# Patient Record
Sex: MALE | Race: AMERICAN INDIAN OR ALASKA NATIVE | URBAN - METROPOLITAN AREA
[De-identification: names, ages, dates, MRNs, and addresses within clinical notes are randomized per-mention and may not be internally consistent; named-entity substitution may affect disease eponyms.]

---

## 2023-12-01 ENCOUNTER — DOCUMENTATION ONLY (OUTPATIENT)
Dept: INTERNAL MEDICINE | Facility: CLINIC | Age: 48
End: 2023-12-01
Payer: COMMERCIAL

## 2023-12-01 DIAGNOSIS — I10 PRIMARY HYPERTENSION: ICD-10-CM

## 2023-12-01 DIAGNOSIS — K08.9 POOR DENTITION: Primary | ICD-10-CM

## 2023-12-01 DIAGNOSIS — I07.1 SEVERE TRICUSPID VALVE REGURGITATION: ICD-10-CM

## 2023-12-01 DIAGNOSIS — F41.9 ANXIETY: ICD-10-CM

## 2023-12-01 DIAGNOSIS — R56.9 SEIZURE (H): ICD-10-CM

## 2023-12-01 DIAGNOSIS — I48.20 CHRONIC ATRIAL FIBRILLATION (H): ICD-10-CM

## 2023-12-01 RX ORDER — GABAPENTIN 400 MG/1
1200 CAPSULE ORAL 3 TIMES DAILY
COMMUNITY
Start: 2023-12-01 | End: 2024-01-04

## 2023-12-01 RX ORDER — AMOXICILLIN 500 MG/1
CAPSULE ORAL
COMMUNITY
Start: 2023-12-01

## 2023-12-01 RX ORDER — AMOXICILLIN 500 MG/1
2000 CAPSULE ORAL PRN
COMMUNITY
Start: 2023-12-01

## 2023-12-01 RX ORDER — LORAZEPAM 0.5 MG/1
0.5 TABLET ORAL 2 TIMES DAILY PRN
Qty: 30 TABLET | Refills: 0 | Status: SHIPPED | OUTPATIENT
Start: 2023-12-01 | End: 2023-12-11

## 2023-12-01 RX ORDER — METOPROLOL SUCCINATE 50 MG/1
50 TABLET, EXTENDED RELEASE ORAL DAILY
COMMUNITY
Start: 2023-12-01 | End: 2024-08-01

## 2023-12-01 NOTE — PROGRESS NOTES
Patient seen at [facility] ([facility]), wanting to change clinics due to clinic barriers around medications. Patient with Afib with RVR, severe TR with life vest. Has poor dentition and needs teeth removed before considering valve surgery. Will place order for refill of lorazapam for anxiety as needed. Place order for dental referral order also. Delayed trying to go to dental at [facility]. Scheduled an appointment for 12/11/23.      Kvng Padron MD  Internal Medicine  Primary Care Clinic, [place], [place]

## 2023-12-11 ENCOUNTER — LAB (OUTPATIENT)
Dept: LAB | Facility: CLINIC | Age: 48
End: 2023-12-11
Payer: COMMERCIAL

## 2023-12-11 ENCOUNTER — OFFICE VISIT (OUTPATIENT)
Dept: INTERNAL MEDICINE | Facility: CLINIC | Age: 48
End: 2023-12-11
Payer: COMMERCIAL

## 2023-12-11 VITALS
DIASTOLIC BLOOD PRESSURE: 84 MMHG | HEART RATE: 86 BPM | WEIGHT: 264.7 LBS | OXYGEN SATURATION: 96 % | HEIGHT: 73 IN | BODY MASS INDEX: 35.08 KG/M2 | SYSTOLIC BLOOD PRESSURE: 113 MMHG

## 2023-12-11 DIAGNOSIS — F43.10 PTSD (POST-TRAUMATIC STRESS DISORDER): ICD-10-CM

## 2023-12-11 DIAGNOSIS — Z13.6 CARDIOVASCULAR SCREENING; LDL GOAL LESS THAN 130: ICD-10-CM

## 2023-12-11 DIAGNOSIS — Z11.59 NEED FOR HEPATITIS B SCREENING TEST: ICD-10-CM

## 2023-12-11 DIAGNOSIS — E11.9 TYPE 2 DIABETES, HBA1C GOAL < 7% (H): ICD-10-CM

## 2023-12-11 DIAGNOSIS — I07.1 SEVERE TRICUSPID VALVE INSUFFICIENCY: ICD-10-CM

## 2023-12-11 DIAGNOSIS — Z11.59 NEED FOR HEPATITIS C SCREENING TEST: ICD-10-CM

## 2023-12-11 DIAGNOSIS — F41.9 ANXIETY: ICD-10-CM

## 2023-12-11 DIAGNOSIS — E11.9 TYPE 2 DIABETES, HBA1C GOAL < 7% (H): Primary | ICD-10-CM

## 2023-12-11 DIAGNOSIS — K08.9 POOR DENTITION: ICD-10-CM

## 2023-12-11 DIAGNOSIS — Z13.6 CARDIOVASCULAR SCREENING; LDL GOAL LESS THAN 100: ICD-10-CM

## 2023-12-11 DIAGNOSIS — E66.01 CLASS 2 SEVERE OBESITY DUE TO EXCESS CALORIES WITH SERIOUS COMORBIDITY AND BODY MASS INDEX (BMI) OF 35.0 TO 35.9 IN ADULT (H): ICD-10-CM

## 2023-12-11 DIAGNOSIS — Z11.4 SCREENING FOR HIV (HUMAN IMMUNODEFICIENCY VIRUS): ICD-10-CM

## 2023-12-11 DIAGNOSIS — E66.812 CLASS 2 SEVERE OBESITY DUE TO EXCESS CALORIES WITH SERIOUS COMORBIDITY AND BODY MASS INDEX (BMI) OF 35.0 TO 35.9 IN ADULT (H): ICD-10-CM

## 2023-12-11 DIAGNOSIS — I10 HYPERTENSION GOAL BP (BLOOD PRESSURE) < 140/90: ICD-10-CM

## 2023-12-11 DIAGNOSIS — I48.20 CHRONIC ATRIAL FIBRILLATION (H): ICD-10-CM

## 2023-12-11 LAB
ALBUMIN SERPL BCG-MCNC: 4.4 G/DL (ref 3.5–5.2)
ALP SERPL-CCNC: 128 U/L (ref 40–150)
ALT SERPL W P-5'-P-CCNC: 28 U/L (ref 0–70)
ANION GAP SERPL CALCULATED.3IONS-SCNC: 8 MMOL/L (ref 7–15)
AST SERPL W P-5'-P-CCNC: 45 U/L (ref 0–45)
BILIRUB SERPL-MCNC: 0.6 MG/DL
BUN SERPL-MCNC: 13.2 MG/DL (ref 6–20)
CALCIUM SERPL-MCNC: 9.6 MG/DL (ref 8.6–10)
CHLORIDE SERPL-SCNC: 105 MMOL/L (ref 98–107)
CHOLEST SERPL-MCNC: 150 MG/DL
CREAT SERPL-MCNC: 0.98 MG/DL (ref 0.67–1.17)
DEPRECATED HCO3 PLAS-SCNC: 30 MMOL/L (ref 22–29)
EGFRCR SERPLBLD CKD-EPI 2021: >90 ML/MIN/1.73M2
FASTING STATUS PATIENT QL REPORTED: NO
GLUCOSE SERPL-MCNC: 86 MG/DL (ref 70–99)
HBA1C MFR BLD: 5.7 %
HBV CORE AB SERPL QL IA: NONREACTIVE
HBV SURFACE AB SERPL IA-ACNC: 0 M[IU]/ML
HBV SURFACE AB SERPL IA-ACNC: NONREACTIVE M[IU]/ML
HBV SURFACE AG SERPL QL IA: NONREACTIVE
HDLC SERPL-MCNC: 44 MG/DL
HIV 1+2 AB+HIV1 P24 AG SERPL QL IA: NONREACTIVE
LDLC SERPL CALC-MCNC: 78 MG/DL
NONHDLC SERPL-MCNC: 106 MG/DL
POTASSIUM SERPL-SCNC: 4.7 MMOL/L (ref 3.4–5.3)
PROT SERPL-MCNC: 8.6 G/DL (ref 6.4–8.3)
SODIUM SERPL-SCNC: 143 MMOL/L (ref 135–145)
TRIGL SERPL-MCNC: 139 MG/DL

## 2023-12-11 PROCEDURE — 99000 SPECIMEN HANDLING OFFICE-LAB: CPT | Performed by: PATHOLOGY

## 2023-12-11 PROCEDURE — 36415 COLL VENOUS BLD VENIPUNCTURE: CPT | Performed by: PATHOLOGY

## 2023-12-11 PROCEDURE — 86706 HEP B SURFACE ANTIBODY: CPT | Performed by: HOSPITALIST

## 2023-12-11 PROCEDURE — 80061 LIPID PANEL: CPT | Performed by: PATHOLOGY

## 2023-12-11 PROCEDURE — 99204 OFFICE O/P NEW MOD 45 MIN: CPT | Performed by: HOSPITALIST

## 2023-12-11 PROCEDURE — 86704 HEP B CORE ANTIBODY TOTAL: CPT | Performed by: HOSPITALIST

## 2023-12-11 PROCEDURE — 86803 HEPATITIS C AB TEST: CPT | Performed by: HOSPITALIST

## 2023-12-11 PROCEDURE — 83036 HEMOGLOBIN GLYCOSYLATED A1C: CPT | Performed by: HOSPITALIST

## 2023-12-11 PROCEDURE — 80053 COMPREHEN METABOLIC PANEL: CPT | Performed by: PATHOLOGY

## 2023-12-11 PROCEDURE — 87389 HIV-1 AG W/HIV-1&-2 AB AG IA: CPT | Performed by: HOSPITALIST

## 2023-12-11 PROCEDURE — 87522 HEPATITIS C REVRS TRNSCRPJ: CPT | Performed by: HOSPITALIST

## 2023-12-11 PROCEDURE — 87340 HEPATITIS B SURFACE AG IA: CPT | Performed by: HOSPITALIST

## 2023-12-11 RX ORDER — LORAZEPAM 0.5 MG/1
0.5 TABLET ORAL DAILY PRN
Qty: 30 TABLET | Refills: 0 | Status: SHIPPED | OUTPATIENT
Start: 2023-12-11 | End: 2024-01-17

## 2023-12-11 RX ORDER — PRAZOSIN HYDROCHLORIDE 1 MG/1
2 CAPSULE ORAL AT BEDTIME
Qty: 60 CAPSULE | Refills: 3 | Status: SHIPPED | OUTPATIENT
Start: 2023-12-11 | End: 2024-04-17

## 2023-12-11 ASSESSMENT — ENCOUNTER SYMPTOMS
DIARRHEA: 0
NERVOUS/ANXIOUS: 1
SHORTNESS OF BREATH: 0
CHILLS: 0
FEVER: 0
ABDOMINAL PAIN: 0
BACK PAIN: 0
CONSTIPATION: 0

## 2023-12-11 NOTE — ASSESSMENT & PLAN NOTE
- Patient previously referred to dental for teeth removal.   - Patient to take Amoxicillin 2gm before dental procedure, continue on Amoxicillin 500mg twice a day for 5 days after.   - Patient to hold Eliquis 5 days before procedure. May restart 1 day after procedure if not bleeding.

## 2023-12-11 NOTE — ASSESSMENT & PLAN NOTE
- Continued on metoprolol and eliquis.  - Patient to hold Eliquis 5 days before dental procedure. May restart 1 day after procedure if not bleeding.

## 2023-12-11 NOTE — ASSESSMENT & PLAN NOTE
- Once seen by dental with dental removal, will refer back to [facility] for possible valve surgery.

## 2023-12-11 NOTE — ASSESSMENT & PLAN NOTE
- Will continue on gabapentin 1200mg TID for now.   - Lorazepam 0.5mg daily as needed for panic attacks.
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
## 2023-12-11 NOTE — PROGRESS NOTES
Ezio is a 48 year old that presents in clinic today for the following:     Chief Complaint   Patient presents with    Establish Care           12/11/2023     9:36 AM   Additional Questions   Roomed by YW   Accompanied by Partner, Orion       Screenings from encounters over the past 10 days    No data recorded       MARYLOU Khanna at 9:37 AM on 12/11/2023
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
## 2023-12-11 NOTE — PATIENT INSTRUCTIONS
- Keep appointment with dental. Follow prior protocol of holding eliquis and amoxicillin for dental procedures.   - Start on prazosin 1mg at bedtime for 1 week. If no dizziness, increase to 2mg at night and continue.   - Will continue on lorazepam 0.5mg daily as needed. If you don't need to use it, don't take.   - Will continue on gabapentin for now.   - Labs today.     - Consider obtaining Influenza and new booster for COVID19 vaccine at the pharmacy.    Follow up again in 3 months or as needed.

## 2023-12-12 LAB — HCV AB SERPL QL IA: REACTIVE

## 2023-12-14 LAB — HCV RNA SERPL NAA+PROBE-ACNC: NOT DETECTED IU/ML

## 2023-12-19 ENCOUNTER — TELEPHONE (OUTPATIENT)
Dept: INTERNAL MEDICINE | Facility: CLINIC | Age: 48
End: 2023-12-19
Payer: COMMERCIAL

## 2023-12-19 NOTE — TELEPHONE ENCOUNTER
[facility] Call Center    Phone Message    May a detailed message be left on voicemail: yes     Reason for Call: Was calling to get Plan of Care for the patient, has the patient ever been seen with the [employer] Life Vest on?  Also is the patient wearing the Life Vest to you r knowledge?  Please call.                   Action Taken: Message routed to:  [facility]    Travel Screening: Not Applicable

## 2023-12-20 NOTE — TELEPHONE ENCOUNTER
At a recent visit, I hadn't seen him wearing a life vest. However, I didn't have him take off his shirt. He was given a life vest after his hospitalization over the summer however. Can we call him directly to ask to give feedback, I'm guessing to [employer] Life Vest?    Kvng Padron MD  Internal Medicine  Primary Care Clinic, [place], [place]

## 2023-12-21 NOTE — TELEPHONE ENCOUNTER
There is only one pt's phone number listed on file and this phone number is not working. When I called x 2, no sound at all.    Left a message to Mary Hennessy that I could not release any information to her at this time.

## 2024-01-03 ENCOUNTER — TELEPHONE (OUTPATIENT)
Dept: INTERNAL MEDICINE | Facility: CLINIC | Age: 49
End: 2024-01-03
Payer: COMMERCIAL

## 2024-01-03 DIAGNOSIS — F41.9 ANXIETY: ICD-10-CM

## 2024-01-03 NOTE — TELEPHONE ENCOUNTER
Medication Question or Refill    Contacts         Type Contact Phone/Fax    01/03/2024 02:51 PM CST Phone (Incoming) Orion Garrido (Emergency Contact) [phone]     S.O calling on behalf of pt. Takes 400 mg, 3 tabs three times a day of gabapentin. Pt received 2 diff rx for [place] and is unable to fill the rx because the total qty does not match the dose / amount pt takes            What medication are you calling about (include dose and sig)?: gabapentin (NEURONTIN) 400 MG capsule     Preferred Pharmacy:     [id number] - [place] - [address]   [address] [place] [address]  Phone: [phone] Fax: [phone]      Controlled Substance Agreement on file:   CSA -- Patient Level:    CSA: None found at the patient level.       Who prescribed the medication?: Kvng Padron     Do you need a refill? Yes    When did you use the medication last? 01/03/2024    Patient offered an appointment? Yes: was seen 12/11/2023    Do you have any questions or concerns?  Yes, pt's refill was denied by pharmacy because the quantity did not match the amount pt takes. Please adjust accordingly and send over a new prescription so pt can refill their medication       Okay to leave a detailed message?: Yes at Home number on file [phone] (home)

## 2024-01-04 RX ORDER — GABAPENTIN 400 MG/1
1200 CAPSULE ORAL 3 TIMES DAILY
Qty: 270 CAPSULE | Refills: 5 | Status: SHIPPED | OUTPATIENT
Start: 2024-01-04 | End: 2024-06-20

## 2024-01-04 NOTE — TELEPHONE ENCOUNTER
Gabapentin : last filled on 12/5/23 with 30 day supply (270 tabs) per  data.  Per Dr. Padron, gabapentin 400 mg, 3 caps TID.

## 2024-01-16 DIAGNOSIS — F41.9 ANXIETY: ICD-10-CM

## 2024-01-16 DIAGNOSIS — F43.10 PTSD (POST-TRAUMATIC STRESS DISORDER): ICD-10-CM

## 2024-01-17 RX ORDER — LORAZEPAM 0.5 MG/1
0.5 TABLET ORAL DAILY PRN
Qty: 30 TABLET | Refills: 0 | Status: SHIPPED | OUTPATIENT
Start: 2024-01-17 | End: 2024-02-13

## 2024-01-17 NOTE — TELEPHONE ENCOUNTER
LORAZEPAM 0.5MG TABLETS   Last Written Prescription Date:  12/11/2023  Last Fill Quantity: 30,   # refills: 0  Last Office Visit : 12/11/2023  Future Office visit:  3/11/2024    Routing refill request to provider for review/approval because:  Drug not on the [facility], P or [facility] refill protocol or controlled substance    Oriana De La Cruz RN  Central Triage Red Flags/Med Refills

## 2024-02-13 DIAGNOSIS — F43.10 PTSD (POST-TRAUMATIC STRESS DISORDER): ICD-10-CM

## 2024-02-13 DIAGNOSIS — F41.9 ANXIETY: ICD-10-CM

## 2024-02-13 RX ORDER — LORAZEPAM 0.5 MG/1
0.5 TABLET ORAL DAILY PRN
Qty: 30 TABLET | Refills: 0 | Status: SHIPPED | OUTPATIENT
Start: 2024-02-17 | End: 2024-03-14

## 2024-02-13 NOTE — TELEPHONE ENCOUNTER
LORAZEPAM 0.5MG TABLETS   Last Written Prescription Date:  1/17/2024  Last Fill Quantity: 30,   # refills: 0  Last Office Visit : 12/11/2023  Future Office visit:  3/11/2024    Routing refill request to provider for review/approval because:  Drug not on the [facility], P or [facility] refill protocol or controlled substance    Oriana De La Cruz RN  Central Triage Red Flags/Med Refills

## 2024-03-13 DIAGNOSIS — F43.10 PTSD (POST-TRAUMATIC STRESS DISORDER): ICD-10-CM

## 2024-03-13 DIAGNOSIS — F41.9 ANXIETY: ICD-10-CM

## 2024-03-14 RX ORDER — LORAZEPAM 0.5 MG/1
0.5 TABLET ORAL DAILY PRN
Qty: 30 TABLET | Refills: 0 | Status: SHIPPED | OUTPATIENT
Start: 2024-03-15 | End: 2024-04-16

## 2024-03-14 NOTE — TELEPHONE ENCOUNTER
LORAZEPAM 0.5MG TABLETS        Last Written Prescription Date:  2/17/24  Last Fill Quantity: 30,   # refills: 0   Last Office Visit : 12/11/2023  Future Office visit:  None  Routing refill request to provider for review/approval because:  Controlled substance

## 2024-04-09 DIAGNOSIS — F43.10 PTSD (POST-TRAUMATIC STRESS DISORDER): ICD-10-CM

## 2024-04-15 DIAGNOSIS — F43.10 PTSD (POST-TRAUMATIC STRESS DISORDER): ICD-10-CM

## 2024-04-15 DIAGNOSIS — F41.9 ANXIETY: ICD-10-CM

## 2024-04-16 RX ORDER — LORAZEPAM 0.5 MG/1
0.5 TABLET ORAL DAILY PRN
Qty: 30 TABLET | Refills: 0 | Status: SHIPPED | OUTPATIENT
Start: 2024-04-16 | End: 2024-05-20

## 2024-04-16 NOTE — TELEPHONE ENCOUNTER
LORAZEPAM 0.5MG TABLETS   Last Written Prescription Date:  3/15/2024  Last Fill Quantity: 30,   # refills: 0  Last Office Visit : 12/11/2023  Future Office visit:  None    Routing refill request to provider for review/approval because:  Drug not on the G, P or [facility] refill protocol or controlled substance    Oriana De La Cruz RN  Central Triage Red Flags/Med Refills

## 2024-04-17 RX ORDER — PRAZOSIN HYDROCHLORIDE 1 MG/1
CAPSULE ORAL
Qty: 180 CAPSULE | Refills: 3 | Status: SHIPPED | OUTPATIENT
Start: 2024-04-17

## 2024-04-17 NOTE — TELEPHONE ENCOUNTER
prazosin (MINIPRESS) 1 MG capsule    Last Written Prescription Date:  12/11/23  Last Fill Quantity: 60,   # refills: 3   Last Office Visit : 12/11/2023  Future Office visit:  None    Routing refill request to provider for review/approval because:  Fails protocol as: Medication indicated for associated diagnosis  ALLOWED:   Medication is associated with one or more of the following diagnoses: Hypertension              Raynaud's Disease              Nightmares              Sleep Disturbance  CURRENT ASSOCIATED DIAGNOSIS IS PTSD

## 2024-05-17 DIAGNOSIS — F43.10 PTSD (POST-TRAUMATIC STRESS DISORDER): ICD-10-CM

## 2024-05-17 DIAGNOSIS — F41.9 ANXIETY: ICD-10-CM

## 2024-05-17 NOTE — TELEPHONE ENCOUNTER
LORazepam (ATIVAN) 0.5 MG tablet 30 tablet 0 4/16/2024       Last Office Visit: 12/11/23  Future Office visit:   none      Routing refill request to provider for review/approval because:  Controlled medication    Coco Roman RN  P Red Flag Triage/MRT

## 2024-05-20 RX ORDER — LORAZEPAM 0.5 MG/1
0.5 TABLET ORAL DAILY PRN
Qty: 30 TABLET | Refills: 0 | Status: SHIPPED | OUTPATIENT
Start: 2024-05-20 | End: 2024-06-20

## 2024-06-16 ENCOUNTER — HEALTH MAINTENANCE LETTER (OUTPATIENT)
Age: 49
End: 2024-06-16

## 2024-06-19 DIAGNOSIS — F41.9 ANXIETY: ICD-10-CM

## 2024-06-19 DIAGNOSIS — F43.10 PTSD (POST-TRAUMATIC STRESS DISORDER): ICD-10-CM

## 2024-06-19 NOTE — TELEPHONE ENCOUNTER
LORAZEPAM 0.5MG TABLETS       Last Written Prescription Date:  5-20-24  Last Fill Quantity: 30,   # refills: 0  Last Office Visit : 12-11-23  Future Office visit:  8-12-24    Routing refill request to provider for review/approval because:  Drug not on the [facility], [facility] or  Health refill protocol or controlled substance     GABAPENTIN 400MG CAPSULES       Last Written Prescription Date:  1-4-24  Last Fill Quantity: 270,   # refills: 5    Routing refill request to provider for review/approval because:  Drug not on the G, P or  Health refill protocol or controlled substance

## 2024-06-20 RX ORDER — LORAZEPAM 0.5 MG/1
0.5 TABLET ORAL DAILY PRN
Qty: 30 TABLET | Refills: 0 | Status: SHIPPED | OUTPATIENT
Start: 2024-06-20 | End: 2024-07-19

## 2024-06-20 RX ORDER — GABAPENTIN 400 MG/1
CAPSULE ORAL
Qty: 270 CAPSULE | Refills: 0 | Status: SHIPPED | OUTPATIENT
Start: 2024-06-20 | End: 2024-07-19

## 2024-07-19 DIAGNOSIS — F43.10 PTSD (POST-TRAUMATIC STRESS DISORDER): ICD-10-CM

## 2024-07-19 DIAGNOSIS — F41.9 ANXIETY: ICD-10-CM

## 2024-07-19 NOTE — TELEPHONE ENCOUNTER
LORazepam (ATIVAN) 0.5 MG tablet 30 tablet 0 6/20/2024     gabapentin (NEURONTIN) 400 MG capsule 270 capsule 0 6/20/2024     Last Office Visit : 12/11/23  Future Office visit:  8/12/24    Routing refill request to provider for review/approval because:  Drug not on the [facility], P or [facility] refill protocol or controlled substance

## 2024-07-19 NOTE — TELEPHONE ENCOUNTER
[facility] Call Center    Phone Message    May a detailed message be left on voicemail: yes     Reason for Call: Medication Refill Request    Has the patient contacted the pharmacy for the refill? Yes   Name of medication being requested:     LORazepam (ATIVAN)   gabapentin (NEURONTIN)    Provider who prescribed the medication:  Kvng Padron MD  Pharmacy:    [id number] - [place], [place] - [address] [place] [facility] [address]    Date medication is needed: 7/19/2024    Pt states he called 3 days ago for this refill request. Writer does not see any documentation of this. Please advise.    Action Taken: Other: PCC    Travel Screening: Not Applicable     Date of Service:

## 2024-07-20 RX ORDER — GABAPENTIN 400 MG/1
1200 CAPSULE ORAL 3 TIMES DAILY
Qty: 270 CAPSULE | Refills: 0 | Status: SHIPPED | OUTPATIENT
Start: 2024-07-20 | End: 2024-08-15

## 2024-07-20 RX ORDER — LORAZEPAM 0.5 MG/1
0.5 TABLET ORAL DAILY PRN
Qty: 30 TABLET | Refills: 0 | Status: SHIPPED | OUTPATIENT
Start: 2024-07-20 | End: 2024-08-15

## 2024-08-01 DIAGNOSIS — I10 PRIMARY HYPERTENSION: ICD-10-CM

## 2024-08-01 DIAGNOSIS — I48.20 CHRONIC ATRIAL FIBRILLATION (H): ICD-10-CM

## 2024-08-01 RX ORDER — METOPROLOL SUCCINATE 50 MG/1
50 TABLET, EXTENDED RELEASE ORAL DAILY
Qty: 90 TABLET | Refills: 1 | Status: SHIPPED | OUTPATIENT
Start: 2024-08-01 | End: 2024-08-15

## 2024-08-01 NOTE — TELEPHONE ENCOUNTER
metoprolol succinate ER (TOPROL XL) 50 MG 24 hr tablet   -- -- 12/1/2023 -- No    Sig - Route: Take 1 tablet (50 mg) by mouth daily - Oral   Class: Historical     Routing refill request to provider for review/approval because:  Medication is reported/historical        Last Office Visit : 12-  Future Office visit:  8-

## 2024-08-01 NOTE — TELEPHONE ENCOUNTER
[facility] Call Center    Phone Message    May a detailed message be left on voicemail: yes     Reason for Call: Medication Refill Request    Has the patient contacted the pharmacy for the refill? Yes   Name of medication being requested:   metoprolol succinate ER (TOPROL XL) 50 MG 24 hr tablet   Provider who prescribed the medication: Dr Padron  Pharmacy:   [facility] #[id number] - [place], [place] - [address]      Date medication is needed: ASAP  patient is out and needs it states that they called the pharmacy.      Action Taken: Message routed to:  [facility] P[facility]    Travel Screening: Not Applicable     Date of Service:

## 2024-08-15 ENCOUNTER — OFFICE VISIT (OUTPATIENT)
Dept: INTERNAL MEDICINE | Facility: CLINIC | Age: 49
End: 2024-08-15
Payer: COMMERCIAL

## 2024-08-15 ENCOUNTER — LAB (OUTPATIENT)
Dept: LAB | Facility: CLINIC | Age: 49
End: 2024-08-15
Payer: COMMERCIAL

## 2024-08-15 VITALS
WEIGHT: 261 LBS | BODY MASS INDEX: 34.59 KG/M2 | SYSTOLIC BLOOD PRESSURE: 124 MMHG | HEIGHT: 73 IN | DIASTOLIC BLOOD PRESSURE: 88 MMHG | HEART RATE: 88 BPM | OXYGEN SATURATION: 97 %

## 2024-08-15 DIAGNOSIS — I07.1 SEVERE TRICUSPID VALVE INSUFFICIENCY: ICD-10-CM

## 2024-08-15 DIAGNOSIS — K08.9 POOR DENTITION: ICD-10-CM

## 2024-08-15 DIAGNOSIS — I48.20 CHRONIC ATRIAL FIBRILLATION (H): ICD-10-CM

## 2024-08-15 DIAGNOSIS — I10 PRIMARY HYPERTENSION: ICD-10-CM

## 2024-08-15 DIAGNOSIS — E11.9 TYPE 2 DIABETES, HBA1C GOAL < 7% (H): ICD-10-CM

## 2024-08-15 DIAGNOSIS — F43.10 PTSD (POST-TRAUMATIC STRESS DISORDER): ICD-10-CM

## 2024-08-15 DIAGNOSIS — F41.9 ANXIETY: ICD-10-CM

## 2024-08-15 DIAGNOSIS — E11.9 TYPE 2 DIABETES, HBA1C GOAL < 7% (H): Primary | ICD-10-CM

## 2024-08-15 LAB
ALBUMIN SERPL BCG-MCNC: 4.1 G/DL (ref 3.5–5.2)
ALP SERPL-CCNC: 149 U/L (ref 40–150)
ALT SERPL W P-5'-P-CCNC: 24 U/L (ref 0–70)
ANION GAP SERPL CALCULATED.3IONS-SCNC: 10 MMOL/L (ref 7–15)
AST SERPL W P-5'-P-CCNC: 42 U/L (ref 0–45)
BILIRUB SERPL-MCNC: 0.5 MG/DL
BUN SERPL-MCNC: 11.9 MG/DL (ref 6–20)
CALCIUM SERPL-MCNC: 9.2 MG/DL (ref 8.8–10.4)
CHLORIDE SERPL-SCNC: 107 MMOL/L (ref 98–107)
CREAT SERPL-MCNC: 1.01 MG/DL (ref 0.67–1.17)
EGFRCR SERPLBLD CKD-EPI 2021: >90 ML/MIN/1.73M2
GLUCOSE SERPL-MCNC: 101 MG/DL (ref 70–99)
HBA1C MFR BLD: 5.7 %
HCO3 SERPL-SCNC: 25 MMOL/L (ref 22–29)
LDLC SERPL DIRECT ASSAY-MCNC: 79 MG/DL
POTASSIUM SERPL-SCNC: 4.3 MMOL/L (ref 3.4–5.3)
PROT SERPL-MCNC: 7.5 G/DL (ref 6.4–8.3)
SODIUM SERPL-SCNC: 142 MMOL/L (ref 135–145)

## 2024-08-15 PROCEDURE — 99000 SPECIMEN HANDLING OFFICE-LAB: CPT | Performed by: PATHOLOGY

## 2024-08-15 PROCEDURE — 99214 OFFICE O/P EST MOD 30 MIN: CPT | Performed by: HOSPITALIST

## 2024-08-15 PROCEDURE — 83721 ASSAY OF BLOOD LIPOPROTEIN: CPT | Performed by: HOSPITALIST

## 2024-08-15 PROCEDURE — 80053 COMPREHEN METABOLIC PANEL: CPT | Performed by: PATHOLOGY

## 2024-08-15 PROCEDURE — 36415 COLL VENOUS BLD VENIPUNCTURE: CPT | Performed by: PATHOLOGY

## 2024-08-15 PROCEDURE — 83036 HEMOGLOBIN GLYCOSYLATED A1C: CPT | Performed by: HOSPITALIST

## 2024-08-15 RX ORDER — AMLODIPINE BESYLATE 10 MG/1
10 TABLET ORAL DAILY
COMMUNITY
End: 2024-08-15

## 2024-08-15 RX ORDER — LORAZEPAM 0.5 MG/1
0.5 TABLET ORAL DAILY PRN
Qty: 30 TABLET | Refills: 0 | Status: SHIPPED | OUTPATIENT
Start: 2024-08-15 | End: 2024-09-12

## 2024-08-15 RX ORDER — GABAPENTIN 400 MG/1
1200 CAPSULE ORAL 3 TIMES DAILY
Qty: 270 CAPSULE | Refills: 0 | Status: SHIPPED | OUTPATIENT
Start: 2024-08-15 | End: 2024-09-12

## 2024-08-15 RX ORDER — METOPROLOL SUCCINATE 50 MG/1
50 TABLET, EXTENDED RELEASE ORAL DAILY
Qty: 90 TABLET | Refills: 3 | Status: SHIPPED | OUTPATIENT
Start: 2024-08-15

## 2024-08-15 RX ORDER — MIDAZOLAM 5 MG/.1ML
SPRAY NASAL
COMMUNITY
Start: 2023-07-19 | End: 2024-08-15

## 2024-08-15 RX ORDER — ALBUTEROL SULFATE 90 UG/1
AEROSOL, METERED RESPIRATORY (INHALATION)
COMMUNITY

## 2024-08-15 RX ORDER — AMLODIPINE BESYLATE 10 MG/1
10 TABLET ORAL DAILY
Qty: 90 TABLET | Refills: 3 | Status: SHIPPED | OUTPATIENT
Start: 2024-08-15
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
## 2024-08-15 NOTE — PATIENT INSTRUCTIONS
- Referral to mental health.   - Referral to dental again. Patient to take Amoxicillin 2 grams 1 hour before procedure and continue Amoxicillin 500mg twice a day for 5 days after. Hold eliquis 4 days before procedure, restart 1 day after procedure if not bleeding issues.   - Call Dr. Padron if you have pain to teeth or swelling of gums. Will  need to prescribe 10 days of amoxicillin if this happens.     - Check labs for LDL, A1c, CMP.   - Start back on prazosin 2mg at bedtime.   - Continue all other medications.     - May consider colonoscopy referral in the future.     Follow up again in 4 months

## 2024-08-16 NOTE — ASSESSMENT & PLAN NOTE
- Referral for dental placed again. Awaiting dental cares before considering tricuspid valve surgery at [facility].   - For dental procedures: Patient to take Amoxicillin 2 grams 1 hour before procedure and continue Amoxicillin 500mg twice a day for 5 days after. Hold eliquis 4 days before procedure, restart 1 day after procedure if not bleeding issues.   - Patient to call Dr. Padron if having pain to teeth or swelling of gums. Will  need to prescribe 10 days of amoxicillin if this happens.

## 2024-09-11 DIAGNOSIS — F43.10 PTSD (POST-TRAUMATIC STRESS DISORDER): ICD-10-CM

## 2024-09-11 DIAGNOSIS — F41.9 ANXIETY: ICD-10-CM

## 2024-09-12 RX ORDER — LORAZEPAM 0.5 MG/1
0.5 TABLET ORAL DAILY PRN
Qty: 30 TABLET | Refills: 0 | Status: SHIPPED | OUTPATIENT
Start: 2024-09-14

## 2024-09-12 RX ORDER — GABAPENTIN 400 MG/1
1200 CAPSULE ORAL 3 TIMES DAILY
Qty: 270 CAPSULE | Refills: 2 | Status: SHIPPED | OUTPATIENT
Start: 2024-09-14

## 2024-09-12 NOTE — TELEPHONE ENCOUNTER
GABAPENTIN 400MG CAPSULES       Last Written Prescription Date:  8-15-24  Last Fill Quantity: 270,   # refills: 3   LORAZEPAM 0.5MG TABLETS       Last Written Prescription Date:  8-15-24  Last Fill Quantity: 30,   # refills: 0  Last Office Visit : 8-15-24  Future Office visit:  12-19-24    Routing refill request to provider for review/approval because:  Drug not on the [facility] or [facility] refill protocol or controlled substance

## 2024-10-07 DIAGNOSIS — F43.10 PTSD (POST-TRAUMATIC STRESS DISORDER): ICD-10-CM

## 2024-10-07 DIAGNOSIS — F41.9 ANXIETY: ICD-10-CM

## 2024-10-08 NOTE — TELEPHONE ENCOUNTER
Medication Requested:  LORazepam (ATIVAN) 0.5 MG tablet 30 tablet 0 9/14/2024 -- No   Sig - Route: Take 1 tablet (0.5 mg) by mouth daily as needed for anxiety. - Oral     ----------------------  Last Office Visit : 8/15/2024  [facility] Internal Medicine [facility] Office visit:     12/19/2024 4:00 PM (30 min)  David   Arrive by:  3:45 PM   [facility] RETURN   [facility]   Kvng Padron MD     ----------------------        Refill decision: Refill pended and routed to the provider for review/determination due to the following criteria not met:       Controlled med request

## 2024-10-10 RX ORDER — LORAZEPAM 0.5 MG/1
0.5 TABLET ORAL DAILY PRN
Qty: 30 TABLET | Refills: 0 | Status: SHIPPED | OUTPATIENT
Start: 2024-10-12 | End: 2024-11-09

## 2024-10-10 NOTE — TELEPHONE ENCOUNTER
Covering for PCP/ordering provider (out of clinic today):  Notes and  reviewed, he is receiving this prescription regularly on a monthly basis.  One refill sent for 10/12, further refills per PCP.    Thanks,  Robert Bowden MD

## 2024-11-08 DIAGNOSIS — F43.10 PTSD (POST-TRAUMATIC STRESS DISORDER): ICD-10-CM

## 2024-11-08 DIAGNOSIS — F41.9 ANXIETY: ICD-10-CM

## 2024-11-09 RX ORDER — LORAZEPAM 0.5 MG/1
0.5 TABLET ORAL DAILY PRN
Qty: 30 TABLET | Refills: 0 | Status: SHIPPED | OUTPATIENT
Start: 2024-11-11

## 2024-11-09 NOTE — TELEPHONE ENCOUNTER
LORazepam (ATIVAN) 0.5 MG tablet    Disp 30 R 0  Start: 10/12/2024     8/15/2024  [facility] Internal Medicine Kvng Soto MD  Internal Medicine    Routed because:  controlled

## 2024-11-28 DIAGNOSIS — F41.9 ANXIETY: ICD-10-CM

## 2024-12-03 RX ORDER — GABAPENTIN 400 MG/1
CAPSULE ORAL
Qty: 270 CAPSULE | Refills: 0 | Status: SHIPPED | OUTPATIENT
Start: 2024-12-04

## 2024-12-03 NOTE — TELEPHONE ENCOUNTER
gabapentin (NEURONTIN) 400 MG capsule       Last Written Prescription Date:  9/14/24  Last Fill Quantity: 270,   # refills: 2  Last Office Visit : 8/15/24  Future Office visit:  12/19/24    Routing refill request to provider for review/approval because:  Gabapentin not on protocol

## 2024-12-03 NOTE — TELEPHONE ENCOUNTER
Controlled substance refill request notes - [place] reviewed 12/03/24    Refill request received for: Gabap[place] 400 Mg Capsule  [place]  Medication last refill: 270 tab, 30 day supply, filled/sold to patient on 11/04/2024  Pended order: Gabapentin 400 Mg Capsule, 270 tab, 30 day supply, fill on or after 12/04/2024     Primary care provider: Kvng Padron  Last office visit this department: 8/15/2024  Last virtual visit this department: Visit date not found  Next appointment with PCP: 12/19/2024     Refill request forwarded to provider for review.     Christy VARGAS LPN  [facility]

## 2024-12-09 DIAGNOSIS — F41.9 ANXIETY: ICD-10-CM

## 2024-12-09 DIAGNOSIS — F43.10 PTSD (POST-TRAUMATIC STRESS DISORDER): ICD-10-CM

## 2024-12-10 RX ORDER — LORAZEPAM 0.5 MG/1
0.5 TABLET ORAL DAILY PRN
Qty: 30 TABLET | Refills: 0 | Status: SHIPPED | OUTPATIENT
Start: 2024-12-10

## 2024-12-10 NOTE — TELEPHONE ENCOUNTER
Controlled substance refill request notes    Refill request received for: Lorazepam 0.5 Mg Tablet  [place]  data reviewed 12/10/24:  Medication last refill: 30 tab, 30 day supply, filled/sold to patient on 11/10/2024  Pended order: Lorazepam 0.5 Mg Tablet, 30 tab, 30 day supply, fill on or after 12/10/2024     Primary care provider: Kvng Padron  Last office visit this department: 8/15/2024  Last virtual visit this department: Visit date not found  Next appointment with PCP: 12/19/2024    Refill request forwarded to provider for review.     Christy VARGAS LPN  [facility]

## 2024-12-10 NOTE — TELEPHONE ENCOUNTER
LORAZEPAM 0.5MG TABLETS       Last Written Prescription Date:  11/11/24  Last Fill Quantity: 30,   # refills: 0   Last office visit : 8/15/2024   Next appointment with PCP: 12/19/2024     Routing refill request to provider for review/approval because:  Controlled med

## 2025-01-02 DIAGNOSIS — F41.9 ANXIETY: ICD-10-CM

## 2025-01-06 RX ORDER — GABAPENTIN 400 MG/1
CAPSULE ORAL
Qty: 270 CAPSULE | Refills: 0 | Status: SHIPPED | OUTPATIENT
Start: 2025-01-06

## 2025-01-06 NOTE — TELEPHONE ENCOUNTER
Patient called looking for status of Gabapentin, please refill or call patient with status.      [id number] - [place] - [address] AT [facility] [address]

## 2025-01-06 NOTE — TELEPHONE ENCOUNTER
Controlled substance refill request notes    Refill request received for: Gabapentin 400 Mg Capsule  [place]  data reviewed 01/06/25:  Medication last refill: 270 tab, 30 day supply, filled/sold to patient on 12/04/2024  Pended order: Gabapentin 400 Mg Capsule, 270 tab, 30 day supply, no delay in fill date     Primary care provider: Kvng Padron  Last office visit this department: 8/15/2024  Last virtual visit this department: Visit date not found  Next appointment with PCP: 01/14/2025    Refill request forwarded to provider for review.     Christy VARGAS LPN  [facility]

## 2025-01-14 DIAGNOSIS — F41.9 ANXIETY: ICD-10-CM

## 2025-01-14 DIAGNOSIS — F43.10 PTSD (POST-TRAUMATIC STRESS DISORDER): ICD-10-CM

## 2025-01-14 RX ORDER — LORAZEPAM 0.5 MG/1
0.5 TABLET ORAL DAILY PRN
Qty: 30 TABLET | Refills: 0 | Status: SHIPPED | OUTPATIENT
Start: 2025-01-14

## 2025-01-14 NOTE — TELEPHONE ENCOUNTER
Controlled substance refill request notes    Refill request received for: Lorazepam 0.5 Mg Tablet  [place]  data reviewed 01/14/25:  Medication last refill: 30 tab, 30 day supply, filled/sold to patient on 12/14/2024  Pended order: Lorazepam 0.5 Mg Tablet, 30 tab, 30 day supply, no delay in fill date     Primary care provider: Kvng Padron  Last office visit this department: 8/15/2024  Last virtual visit this department: Visit date not found  Next appointment with PCP:   Future Appointments 1/14/2025 - 7/13/2025        Date Visit Type Length Department Provider     1/14/2025  4:30 PM [facility] RETURN 30 min [facility] Kvng Padron MD    Location Instructions:     [facility] ([facility]) is in a dense urban area with multiple transportation and parking options. You may wish to review options for  service and self-parking in more detail on the [facility] s website at [email or web address].&nbsp;                      Refill request forwarded to provider for review.     Christy VARGAS LPN  [facility]

## 2025-01-14 NOTE — TELEPHONE ENCOUNTER
LORAZEPAM 0.5MG TABLETS      Last Written Prescription Date:  12/10/24  Last Fill Quantity: 30,   # refills: 0  Last Office Visit : 8/15/24  Future Office visit:  1/14/25    Routing refill request to provider for review/approval because:  Controlled  LORAZEPAM 0.5MG TABLETS

## 2025-02-02 DIAGNOSIS — F41.9 ANXIETY: ICD-10-CM

## 2025-02-05 DIAGNOSIS — F43.10 PTSD (POST-TRAUMATIC STRESS DISORDER): ICD-10-CM

## 2025-02-05 DIAGNOSIS — F41.9 ANXIETY: ICD-10-CM

## 2025-02-05 RX ORDER — GABAPENTIN 400 MG/1
CAPSULE ORAL
Qty: 270 CAPSULE | Refills: 0 | Status: SHIPPED | OUTPATIENT
Start: 2025-02-05

## 2025-02-05 RX ORDER — LORAZEPAM 0.5 MG/1
0.5 TABLET ORAL DAILY PRN
Qty: 30 TABLET | Refills: 0 | Status: SHIPPED | OUTPATIENT
Start: 2025-02-15

## 2025-02-05 NOTE — TELEPHONE ENCOUNTER
Controlled substance refill request notes    Refill request received for: Lorazepam 0.5 Mg Tablet  [place] reviewed 02/05/25:  Medication last refill: 30 tab, 30 day supply, filled/sold to patient on 01/16/2025  Pended order: Lorazepam 0.5 Mg Tablet, 30 tab, 30 day supply, fill on or after 02/15/2025     Primary care provider: Kvng Padron  Last office visit this department: 8/15/2024  Last virtual visit this department: Visit date not found  Next appointment with PCP:   Future Appointments 2/5/2025 - 8/4/2025        Date Visit Type Length Department Provider     2/20/2025  4:00 PM [facility] RETURN 30 min [facility] vKng Padron MD    Location Instructions:     [facility] ([facility]) is in a dense urban area with multiple transportation and parking options. You may wish to review options for  service and self-parking in more detail on the [facility] s website at [email or web address].&nbsp;                      Refill request forwarded to provider for review.     ROBBIE Mata [facility]

## 2025-02-05 NOTE — TELEPHONE ENCOUNTER
[facility] Call [facility]    Phone Message    May a detailed message be left on voicemail: yes     Reason for Call: Medication Refill Request    Has the patient contacted the pharmacy for the refill? Yes   Name of medication being requested: LORazepam (ATIVAN)   Provider who prescribed the medication: Kvng Padron MD  Pharmacy:    [id number] - [place] - [address] [facility] [address]    Date medication is needed: ASAP      Action Taken: Other: PCC    Travel Screening: Not Applicable     Date of Service:

## 2025-02-05 NOTE — TELEPHONE ENCOUNTER
Controlled substance refill request notes    Refill request received for: Gabapentin 400 Mg Capsule  [place]  data reviewed 02/05/25:  Medication last refill: 270 tab, 30 day supply, filled/sold to patient on 01/06/2025  Pended order: Gabapentin 400 Mg Capsule, 270 tab, 30 day supply, fill on or after 02/05/2025    Primary care provider: Kvng Padron  Last office visit this department: 8/15/2024  Last virtual visit this department: Visit date not found  Next appointment with PCP:   Future Appointments 2/5/2025 - 8/4/2025        Date Visit Type Length Department Provider     2/20/2025  4:00 PM [facility] RETURN 30 min [facility] Kvng Padron MD    Location Instructions:     [facility] ([facility]) is in a dense urban area with multiple transportation and parking options. You may wish to review options for  service and self-parking in more detail on the [facility] s website at [email or web address].&nbsp;                      Refill request forwarded to provider for review.     Christy VARGAS LPN  [facility]

## 2025-02-12 DIAGNOSIS — F43.10 PTSD (POST-TRAUMATIC STRESS DISORDER): ICD-10-CM

## 2025-02-12 DIAGNOSIS — F41.9 ANXIETY: ICD-10-CM

## 2025-02-12 RX ORDER — LORAZEPAM 0.5 MG/1
TABLET ORAL
Qty: 30 TABLET | OUTPATIENT
Start: 2025-02-12

## 2025-02-12 NOTE — TELEPHONE ENCOUNTER
LORazepam (ATIVAN) 0.5 MG /30 tab/0 refill has been sent on 2/15/2025 to [facility] #[id number] - [place], [place] - [address] AT [facility] [address]

## 2025-03-02 ENCOUNTER — HEALTH MAINTENANCE LETTER (OUTPATIENT)
Age: 50
End: 2025-03-02

## 2025-03-02 DIAGNOSIS — F41.9 ANXIETY: ICD-10-CM

## 2025-03-06 RX ORDER — GABAPENTIN 400 MG/1
CAPSULE ORAL
Qty: 270 CAPSULE | Refills: 1 | Status: SHIPPED | OUTPATIENT
Start: 2025-03-08

## 2025-03-06 NOTE — TELEPHONE ENCOUNTER
Last Written Prescription:  gabapentin (NEURONTIN) 400 MG capsule 270 capsule 0 2/5/2025     ----------------------  Last Visit Date: 8/15/24  Future Visit Date: 4/22/25  ----------------------        Refill decision: Medication unable to be refilled by RN due to: Medication not included in refill protocol policy         Request from pharmacy:  Requested Prescriptions   Pending Prescriptions Disp Refills    gabapentin (NEURONTIN) 400 MG capsule [Pharmacy Med Name: GABAPENTIN 400MG CAPSULES] 270 capsule 0     Sig: TAKE 3 CAPSULES(1200 MG) BY MOUTH THREE TIMES DAILY       There is no refill protocol information for this order

## 2025-03-06 NOTE — TELEPHONE ENCOUNTER
Covering for PCP/ordering prescriber (out of office):  Refills sent.   Follow-up as scheduled.     Thanks,  Robert Bowden MD

## 2025-03-06 NOTE — TELEPHONE ENCOUNTER
Controlled substance refill request notes    Refill request received for: Gabapentin 400 Mg Capsule  [place] reviewed 03/06/25:  Medication last refill: 270 tab, 30 day supply, filled/sold to patient on 02/06/2025  Pended order: Gabapentin 400 Mg Capsule, 270 tab, 30 day supply, fill on or after 03/08/2025     Primary care provider: Kvng Padron  Last office visit this department: 8/15/2024 - Dr. Padron  Next appointment with PCP:   Future Appointments 3/6/2025 - 9/2/2025        Date Visit Type Length Department Provider     4/22/2025  4:30 PM [facility] RETURN 30 min [facility] Kvng Padron MD    Location Instructions:     [facility] ([facility]) is in a dense urban area with multiple transportation and parking options. You may wish to review options for  service and self-parking in more detail on the [facility] s website at [email or web address].&nbsp;                      Refill request forwarded to provider for review.     Christy VARGAS LPN  [facility]

## 2025-03-24 DIAGNOSIS — F43.10 PTSD (POST-TRAUMATIC STRESS DISORDER): ICD-10-CM

## 2025-03-24 DIAGNOSIS — F41.9 ANXIETY: ICD-10-CM

## 2025-03-24 RX ORDER — LORAZEPAM 0.5 MG/1
0.5 TABLET ORAL DAILY PRN
Qty: 30 TABLET | Refills: 0 | Status: SHIPPED | OUTPATIENT
Start: 2025-03-24

## 2025-03-24 NOTE — TELEPHONE ENCOUNTER
Controlled substance refill request notes    Refill request received for: Lorazepam 0.5 Mg Tablet  [place]  data reviewed 03/24/25:  Medication last refill: 30 tab, 30 day supply, filled/sold to patient on 02/16/2025  Pended order: Lorazepam 0.5 Mg Tablet, 30 tab, 30 day supply, no delay in fill date     Primary care provider: Kvng Padron  Last office visit this department: 8/15/2024  Next appointment with PCP:   Future Appointments 3/24/2025 - 9/20/2025        Date Visit Type Length Department Provider     4/22/2025  4:30 PM [facility] RETURN 30 min [facility] Kvng Padron MD    Location Instructions:     [facility] ([facility]) is in a dense urban area with multiple transportation and parking options. You may wish to review options for  service and self-parking in more detail on the [facility] s website at [email or web address].&nbsp;                      Refill request forwarded to provider for review.     Christy VARGAS LPN  [facility]

## 2025-03-24 NOTE — TELEPHONE ENCOUNTER
[facility] Call Center    Phone Message    May a detailed message be left on voicemail: yes     Reason for Call: Medication Refill Request    Has the patient contacted the pharmacy for the refill? Yes     Name of medication being requested:   LORazepam (ATIVAN) 0.5 MG tablet     Provider who prescribed the medication: Vito    Pharmacy:   [facility] #[id number] - [place], [place] - [address] [place]        Date medication is needed: 3/24/2025       Action Taken: Message routed to:  [facility]    Travel Screening: Not Applicable     Date of Service:

## 2025-04-21 DIAGNOSIS — F41.9 ANXIETY: ICD-10-CM

## 2025-04-21 DIAGNOSIS — F43.10 PTSD (POST-TRAUMATIC STRESS DISORDER): ICD-10-CM

## 2025-04-22 ENCOUNTER — OFFICE VISIT (OUTPATIENT)
Dept: INTERNAL MEDICINE | Facility: CLINIC | Age: 50
End: 2025-04-22
Payer: COMMERCIAL

## 2025-04-22 VITALS
WEIGHT: 257.5 LBS | SYSTOLIC BLOOD PRESSURE: 119 MMHG | TEMPERATURE: 98.8 F | RESPIRATION RATE: 18 BRPM | BODY MASS INDEX: 34.13 KG/M2 | OXYGEN SATURATION: 97 % | DIASTOLIC BLOOD PRESSURE: 79 MMHG | HEART RATE: 89 BPM | HEIGHT: 73 IN

## 2025-04-22 DIAGNOSIS — J06.9 VIRAL UPPER RESPIRATORY TRACT INFECTION: ICD-10-CM

## 2025-04-22 DIAGNOSIS — F43.10 PTSD (POST-TRAUMATIC STRESS DISORDER): ICD-10-CM

## 2025-04-22 DIAGNOSIS — F41.9 ANXIETY: ICD-10-CM

## 2025-04-22 DIAGNOSIS — F41.1 GENERALIZED ANXIETY DISORDER: ICD-10-CM

## 2025-04-22 DIAGNOSIS — E11.9 TYPE 2 DIABETES, HBA1C GOAL < 7% (H): Primary | ICD-10-CM

## 2025-04-22 DIAGNOSIS — L84 CALLUS OF FOOT: ICD-10-CM

## 2025-04-22 DIAGNOSIS — K08.9 POOR DENTITION: ICD-10-CM

## 2025-04-22 DIAGNOSIS — Z23 NEED FOR PNEUMOCOCCAL VACCINE: ICD-10-CM

## 2025-04-22 PROCEDURE — 90471 IMMUNIZATION ADMIN: CPT | Performed by: HOSPITALIST

## 2025-04-22 PROCEDURE — 90677 PCV20 VACCINE IM: CPT | Performed by: HOSPITALIST

## 2025-04-22 PROCEDURE — 99214 OFFICE O/P EST MOD 30 MIN: CPT | Mod: 25 | Performed by: HOSPITALIST

## 2025-04-22 RX ORDER — LORAZEPAM 0.5 MG/1
0.5 TABLET ORAL 2 TIMES DAILY PRN
Qty: 60 TABLET | Refills: 0 | Status: SHIPPED | OUTPATIENT
Start: 2025-04-24

## 2025-04-22 RX ORDER — AMOXICILLIN 500 MG/1
CAPSULE ORAL
Qty: 10 CAPSULE | Refills: 2 | Status: SHIPPED | OUTPATIENT
Start: 2025-04-22

## 2025-04-22 RX ORDER — LORAZEPAM 0.5 MG/1
0.5 TABLET ORAL
Qty: 30 TABLET | Refills: 0 | Status: SHIPPED | OUTPATIENT
Start: 2025-04-24 | End: 2025-04-22

## 2025-04-22 RX ORDER — AMOXICILLIN 500 MG/1
2000 CAPSULE ORAL PRN
Qty: 4 CAPSULE | Refills: 2 | Status: SHIPPED | OUTPATIENT
Start: 2025-04-22

## 2025-04-22 NOTE — ASSESSMENT & PLAN NOTE
BP controlled, last A1c was 5.7 and LDL was 79 in August. Monofilament of feet with normal sensation.   - Recheck A1c and urine albumin.  - [facility] at [place] for last eye exam. Was told he had a normal diabetic eye exam recently.

## 2025-04-22 NOTE — PROGRESS NOTES
Assessment & Plan   Problem List Items Addressed This Visit       Viral upper respiratory tract infection     Appears to be recovering. Continue supportive cares.          Type 2 diabetes, HbA1c goal < 7% (H) - Primary     BP controlled, last A1c was 5.7 and LDL was 79 in August. Monofilament of feet with normal sensation.   - Recheck A1c and urine albumin.  - [facility] for [facility] at [place] for last eye exam. Was told he had a normal diabetic eye exam recently.             Relevant Orders    Albumin Random Urine Quantitative with Creat Ratio    HEMOGLOBIN A1C    Poor dentition     - Referral previously for dental. Awaiting dental cares before considering tricuspid valve surgery at [facility]. Will message clinic RN to follow up on referral.   - For dental procedures: Patient to take Amoxicillin 2 grams 1 hour before procedure and continue Amoxicillin 500mg twice a day for 5 days after. Hold eliquis 4 days before procedure, restart 1 day after procedure if not bleeding issues.   - Patient to call Dr. Padron if having pain to teeth or swelling of gums. Will  need to prescribe 10 days of amoxicillin if this happens.          Relevant Medications    amoxicillin (AMOXIL) 500 MG capsule    amoxicillin (AMOXIL) 500 MG capsule    Callus of foot     Large callus along left foot. Monofilament sensation normal in both feet today.   - Referral to podiatry.          Relevant Orders    Orthopedic  Referral    Anxiety disorder     - Consider in the future to start a medication daily (selective serotonin reuptake inhibitor category) for anxiety and work to decrease ativan use.   - Continued on ativan prn. Continued on gabapentin 1200mg TID.           Other Visit Diagnoses       Need for pneumococcal vaccine        Relevant Orders    Pneumococcal 20 Valent Conjugate (Prevnar 20) (Completed)                  BMI  Estimated body mass index is 34.39 kg/m  as calculated from the following:    Height as of this encounter:  "1.843 m (6' 0.56\").    Weight as of this encounter: 116.8 kg (257 lb 8 oz).       Follow-up  Return in about 4 months (around 8/22/2025).    Selin Holguin is a 49 year old, presenting for the following health issues:  Follow Up and Refill Request      4/22/2025     5:05 PM   Additional Questions   Roomed by Shanel LAY     History of Present Illness       Reason for visit:  Follow up       Mentions no calls form dental yet for his teeth. Hasn't had a follow up with cardiology again. Has some swelling in his legs. No shortness of breath. No shortness of breath when laying down flat.     Mentions having some sickness about 1 week ago and now improving. Had nasal drainage and sore throat. Had a cough and concerned about pneumonia as he's had this in the past.     Continues on ativan and prazosin at night. Using ativan up to twice a day for his anxiety. Discussed considering an additional medication for his anxiety due to risk of withdrawal with ativan use if not taking it. He did not like prior use of buspirone. Did not want to start any new medication at this time for anxiety. Significant other is present and mentions that the past year he has been doing more in public.       Review of Systems  Constitutional, neuro, ENT, endocrine, pulmonary, cardiac, gastrointestinal, genitourinary, musculoskeletal, integument and psychiatric systems are negative, except as otherwise noted.      Objective    /79 (BP Location: Right arm, Patient Position: Sitting, Cuff Size: Adult Large)   Pulse 89   Temp 98.8  F (37.1  C)   Resp 18   Ht 1.843 m (6' 0.56\")   Wt 116.8 kg (257 lb 8 oz)   SpO2 97%   BMI 34.39 kg/m    Body mass index is 34.39 kg/m .  Physical Exam   GENERAL: alert and no distress  EYES: Eyes grossly normal to inspection, and conjunctivae and sclerae normal  HENT: nose and mouth without ulcers or lesions. Difficult visualizing posterior pharynx. No tenderness over frontal or maxillary sinuses.   NECK: " no adenopathy, no asymmetry, masses, or scars  RESP: lungs clear to auscultation - no rales, rhonchi or wheezes  CV: regular rate and rhythm, normal S1 S2, no S3 or S4, no murmur, click or rub, no peripheral edema  MS: no gross musculoskeletal defects noted, no edema  SKIN: no  rashes. Left bottom of foot with about 1.5cm callus present. Very small callus along right bottom of foot.   NEURO: Normal strength and tone, mentation intact and speech normal  PSYCH: mentation appears normal, affect normal/bright            Signed Electronically by: Kvng Padron MD

## 2025-04-22 NOTE — ASSESSMENT & PLAN NOTE
Large callus along left foot. Monofilament sensation normal in both feet today.   - Referral to podiatry.

## 2025-04-22 NOTE — TELEPHONE ENCOUNTER
Controlled substance refill request notes    Refill request received for: Lorazepam 0.5 Mg Tablet  [place]  data reviewed 04/22/25:  Medication last refill: qty 30, 30 day supply, filled/sold to patient on 03/25/2025  Pended order: Lorazepam 0.5 Mg Tablet, qty 30, 30 day supply, fill on or after 04/24/2025     Primary care provider: Kvng Padron  Last office visit with this department: 8/15/2024  Next appointment with PCP:   Future Appointments 4/22/2025 - 10/19/2025        Date Visit Type Length Department Provider     4/22/2025  4:30 PM [facility] RETURN 30 min [facility] Kvng Padron MD    Location Instructions:     Due to [place] [address], travel times to this location may be longer than usual. Please plan for extra travel time and check the [place] Department of Transportation [address] [place] website for delay, closure, and detour information.  [facility] ([facility]) is in a dense urban area with multiple transportation and parking options. You may wish to review options for  service and self-parking in more detail on the [facility] s website at [email or web address].                     Refill request forwarded to provider for review.     Christy VARGAS LPN  [facility]

## 2025-04-22 NOTE — ASSESSMENT & PLAN NOTE
- Consider in the future to start a medication daily (selective serotonin reuptake inhibitor category) for anxiety and work to decrease ativan use.   - Continued on ativan prn. Continued on gabapentin 1200mg TID.

## 2025-04-22 NOTE — TELEPHONE ENCOUNTER
Last Written Prescription:     LORazepam (ATIVAN) 0.5 MG tablet 30 tablet 0 3/24/2025 -- No   Sig - Route: Take 1 tablet (0.5 mg) by mouth daily as needed for anxiety. MUST KEEP 4/22/2025 VISIT FOR REFILLS - Oral     ----------------------  Last Visit Date: 8/15/24  Future Visit Date: 4/22/25    Refill decision: Medication unable to be refilled by RN due to: Controlled medication LORazepam (ATIVAN) 0.5 MG tablet        Request from pharmacy:  Requested Prescriptions   Pending Prescriptions Disp Refills    LORazepam (ATIVAN) 0.5 MG tablet [Pharmacy Med Name: LORAZEPAM 0.5MG TABLETS] 30 tablet      Sig: TAKE 1 TABLET(0.5 MG) BY MOUTH DAILY AS NEEDED FOR ANXIETY       Rx Protocol Controlled Substance Failed - 4/22/2025  9:05 AM        Failed - Visit with relevant provider in past 3 months or upcoming 3 months        Failed - Medication is active on med list and the sig matches        Failed - Urine drug screeen results on file in past 12 months     [unfilled]           Failed - Controlled Substance Agreement on file in last 12 months     Please review last Controlled Substance Pain agreement document.   CSA -- Encounter Level:    CSA: None found at the encounter level.       CSA -- Patient Level:    CSA: None found at the patient level.               Failed - Auto Fail - Please forward to Provider        Passed - Medication not refilled in past 28 days     Invalid Medication Grouper          Passed - No Opioids on active med list

## 2025-04-22 NOTE — PATIENT INSTRUCTIONS
- [facility] for [facility] at [place] for last eye exam.   - Labs for A1c and urine albumin when able.   - [id number] vaccine.   - Will ask my nurse to call about dental referral.   - Referral to podiatry.   - Consider in the future to start a medication daily (selective serotonin reuptake inhibitor category) for anxiety and work to decrease ativan use.       Follow up again in 4 months.

## 2025-04-22 NOTE — ASSESSMENT & PLAN NOTE
- Referral previously for dental. Awaiting dental cares before considering tricuspid valve surgery at [facility]. Will message clinic RN to follow up on referral.   - For dental procedures: Patient to take Amoxicillin 2 grams 1 hour before procedure and continue Amoxicillin 500mg twice a day for 5 days after. Hold eliquis 4 days before procedure, restart 1 day after procedure if not bleeding issues.   - Patient to call Dr. Padron if having pain to teeth or swelling of gums. Will  need to prescribe 10 days of amoxicillin if this happens.

## 2025-04-23 ENCOUNTER — TELEPHONE (OUTPATIENT)
Dept: INTERNAL MEDICINE | Facility: CLINIC | Age: 50
End: 2025-04-23
Payer: COMMERCIAL

## 2025-04-23 ENCOUNTER — PATIENT OUTREACH (OUTPATIENT)
Dept: CARE COORDINATION | Facility: CLINIC | Age: 50
End: 2025-04-23
Payer: COMMERCIAL

## 2025-04-23 NOTE — TELEPHONE ENCOUNTER
[facility] dental clinic ( school of dentistry, dental clinic)  Appointments: [phone]    Spoke with pt and phone number given. If it does not work, he will contact us.    Soon-Mi  ----------------------------------------------------------------------------      ----- Message from Kvng Padron sent at 4/22/2025  5:21 PM CDT -----  Regarding: Dental referral?  Raj Ford,     I replaced a referral to [facility] in the past for dental. Unclear what the issue is with dental referral. Patient has been waiting for a few years now. Has a severe tricuspid valve and Afib history, hoping for dental with removal of a few poorly dentured teeth prior to valve surgery. Able to follow up about the dental referral?      Kvng Padron MD  Internal Medicine  Primary Care Clinic, [place], [place]

## 2025-04-30 DIAGNOSIS — F41.9 ANXIETY: ICD-10-CM

## 2025-05-01 RX ORDER — GABAPENTIN 400 MG/1
CAPSULE ORAL
Qty: 270 CAPSULE | Refills: 1 | Status: SHIPPED | OUTPATIENT
Start: 2025-05-03

## 2025-05-01 NOTE — TELEPHONE ENCOUNTER
Last Written Prescription:   Disp Refills Start End DEEDEE   gabapentin (NEURONTIN) 400 MG capsule 270 capsule 1 3/8/2025 -- No   Sig: TAKE 3 CAPSULES(1200 MG) BY MOUTH THREE TIMES DAILY     ----------------------  Last Visit Date:   4/22/2025  [facility] [place]      Future Visit Date: 8/26/2025  ----------------------      Refill decision: Medication unable to be refilled by RN due to: Medication not included in refill protocol policy         Request from pharmacy:  Requested Prescriptions   Pending Prescriptions Disp Refills    gabapentin (NEURONTIN) 400 MG capsule [Pharmacy Med Name: GABAPENTIN 400MG CAPSULES] 270 capsule 1     Sig: TAKE 3 CAPSULES(1200 MG) BY MOUTH THREE TIMES DAILY       There is no refill protocol information for this order

## 2025-05-25 DIAGNOSIS — F43.10 PTSD (POST-TRAUMATIC STRESS DISORDER): ICD-10-CM

## 2025-05-25 DIAGNOSIS — F41.9 ANXIETY: ICD-10-CM

## 2025-05-28 RX ORDER — LORAZEPAM 0.5 MG/1
0.5 TABLET ORAL 2 TIMES DAILY PRN
Qty: 60 TABLET | Refills: 0 | Status: SHIPPED | OUTPATIENT
Start: 2025-05-28

## 2025-05-28 NOTE — TELEPHONE ENCOUNTER
Last Written Prescription:  LORazepam (ATIVAN) 0.5 MG tablet 60 tablet 0 4/24/2025 -- No   Sig - Route: Take 1 tablet (0.5 mg) by mouth 2 times daily as needed for anxiety. - Oral   Sent to pharmacy as: LORazepam 0.5 MG Oral Tablet (ATIVAN)   Class: E-Prescribe   Earliest Fill Date: 4/24/2025   Notes to Pharmacy: Ignore recent prescription.   Order: [id number]     ----------------------  Last Visit Date: 4/22/25  Future Visit Date: 8/26/25  ----------------------        Refill decision: Medication unable to be refilled by RN due to: Controlled medication and Medication not included in refill protocol policy         Request from pharmacy:  Requested Prescriptions   Pending Prescriptions Disp Refills    LORazepam (ATIVAN) 0.5 MG tablet [Pharmacy Med Name: LORAZEPAM 0.5MG TABLETS] 60 tablet      Sig: TAKE 1 TABLET(0.5 MG) BY MOUTH TWICE DAILY AS NEEDED FOR ANXIETY       Rx Protocol Controlled Substance Failed - 5/28/2025 10:47 AM        Failed - Urine drug screeen results on file in past 12 months     [unfilled]           Failed - Controlled Substance Agreement on file in last 12 months     Please review last Controlled Substance Pain agreement document.   CSA -- Encounter Level:    CSA: None found at the encounter level.       CSA -- Patient Level:    CSA: None found at the patient level.               Failed - Auto Fail - Please forward to Provider        Passed - Visit with relevant provider in past 3 months or upcoming 3 months (provided they have been seen in the last 6 months)        Passed - Medication is active on med list and the sig matches        Passed - Medication not refilled in past 28 days     Invalid Medication [place]ou[place]          Passed - No Opioids on active med list

## 2025-05-28 NOTE — TELEPHONE ENCOUNTER
Controlled substance refill request notes    Refill request received for: Lorazepam 0.5 Mg Tablet  [place]  data reviewed 05/28/25:  Medication last refill: qty 60, 30 day supply, filled/sold to patient on 04/24/2025  Pended order: Lorazepam 0.5 Mg Tablet, qty 60, 30 day supply, no delay in fill date     Primary care provider: Kvng Padron  Last office visit with this department: 4/22/2025  Next appointment with PCP:   Future Appointments 5/28/2025 - 11/24/2025        Date Visit Type Length Department Provider     8/26/2025  1:30 PM [facility] RETURN 30 min [facility] Kvng Padron MD    Location Instructions:     Due to [place] [address], travel times to this location may be longer than usual. Please plan for extra travel time and check the [place] [employer] [address] [place] website for delay, closure, and detour information.  The [facility] ([facility]) is in a dense urban area with multiple transportation and parking options. You may wish to review options for  service and self-parking in more detail on the [facility] s website at [email or web address].                     Refill request forwarded to provider for review.     Christy VARGAS LPN  [facility]

## 2025-06-21 ENCOUNTER — HEALTH MAINTENANCE LETTER (OUTPATIENT)
Age: 50
End: 2025-06-21

## 2025-06-27 DIAGNOSIS — F43.10 PTSD (POST-TRAUMATIC STRESS DISORDER): ICD-10-CM

## 2025-06-27 DIAGNOSIS — F41.9 ANXIETY: ICD-10-CM

## 2025-06-30 DIAGNOSIS — F41.9 ANXIETY: ICD-10-CM

## 2025-06-30 RX ORDER — LORAZEPAM 0.5 MG/1
0.5 TABLET ORAL 2 TIMES DAILY PRN
Qty: 60 TABLET | Refills: 0 | Status: SHIPPED | OUTPATIENT
Start: 2025-06-30

## 2025-06-30 NOTE — TELEPHONE ENCOUNTER
Controlled substance refill request notes    Refill request received for: Lorazepam 0.5 Mg Tablet  [place]  data reviewed 06/30/25:  Medication last refill: qty 60, 30 day supply, filled/sold to patient on 05/28/2025  Pended order: Lorazepam 0.5 Mg Tablet, qty 60, 30 day supply, no delay in fill date     Primary care provider: Kvng Padron  Last office visit with this department: 4/22/2025  Next appointment with PCP:   Future Appointments 6/30/2025 - 12/27/2025        Date Visit Type Length Department Provider     8/26/2025  1:30 PM [facility] RETURN 30 min [facility] Kvng Padron MD    Location Instructions:     Due to road [place] on [address], travel times to this location may be longer than usual. Please plan for extra travel time and check the [place] [employer] [address] [place] website for delay, closure, and detour information.  The [facility] ([facility]) is in a dense urban area with multiple transportation and parking options. You may wish to review options for  service and self-parking in more detail on the [facility] s website at [email or web address].                     Refill request forwarded to provider for review.     Christy VARGAS LPN  [facility]

## 2025-06-30 NOTE — TELEPHONE ENCOUNTER
Patient is calling stating he ran out of this medication on 06/28 and needs a refill. Writer stated to him to call 5-7 business days in advance next time before he runs out and ask for a refill.

## 2025-07-01 RX ORDER — GABAPENTIN 400 MG/1
1200 CAPSULE ORAL 3 TIMES DAILY
Qty: 270 CAPSULE | Refills: 1 | Status: SHIPPED | OUTPATIENT
Start: 2025-07-01

## 2025-07-01 NOTE — TELEPHONE ENCOUNTER
Last Visit Date: 4/22/2025 [facility] [place]  Future Visit Date: 8/26/2025  ----------------------  Medication Requested: gabapentin (NEURONTIN) 400 MG capsule   Last Written Prescription: 5/1/2025 Disp:270 R:1  ---------------------  [x]  Refill decision: Medication unable to be refilled by RN due to:     []    Pt not seen within past 12 months;  No FOV;  or FOV exceeds timeframe per protocol requirements  []    Compliance - lapse in therapy/gap in refills; No Shows; Cancellations  []    Verification - order discrepancy, clarification needed, Sig modification needed  []    Controlled medication  [x]    Medication not included in refill protocol policy  []    Abnormal labs/test:  []    Overdue labs/test:    []    Medication not active on Pt's med list  []    Drug interaction Warning  []    Medication - Last script is Reported/Historical/Transitional  []    Advanced refill request  []    Review Needed: New med; Med adjusted within <= 30 days; Safety Alert; Lab monitoring required  []    Other:     Request from pharmacy:  Requested Prescriptions   Pending Prescriptions Disp Refills    gabapentin (NEURONTIN) 400 MG capsule [Pharmacy Med Name: GABAPENTIN 400MG CAPSULES] 270 capsule 1     Sig: TAKE 3 CAPSULES(1200 MG) BY MOUTH THREE TIMES DAILY       There is no refill protocol information for this order

## 2025-07-29 DIAGNOSIS — F43.10 PTSD (POST-TRAUMATIC STRESS DISORDER): ICD-10-CM

## 2025-07-29 DIAGNOSIS — F41.9 ANXIETY: ICD-10-CM

## 2025-07-30 RX ORDER — LORAZEPAM 0.5 MG/1
0.5 TABLET ORAL 2 TIMES DAILY PRN
Qty: 60 TABLET | Refills: 0 | Status: SHIPPED | OUTPATIENT
Start: 2025-07-30

## 2025-08-19 DIAGNOSIS — I48.20 CHRONIC ATRIAL FIBRILLATION (H): ICD-10-CM

## 2025-08-27 DIAGNOSIS — F41.9 ANXIETY: ICD-10-CM

## 2025-08-28 DIAGNOSIS — F43.10 PTSD (POST-TRAUMATIC STRESS DISORDER): ICD-10-CM

## 2025-08-28 DIAGNOSIS — F41.9 ANXIETY: ICD-10-CM

## 2025-08-28 RX ORDER — LORAZEPAM 0.5 MG/1
0.5 TABLET ORAL 2 TIMES DAILY PRN
Qty: 60 TABLET | Refills: 0 | Status: SHIPPED | OUTPATIENT
Start: 2025-08-29

## 2025-09-02 RX ORDER — GABAPENTIN 400 MG/1
CAPSULE ORAL
Qty: 270 CAPSULE | Refills: 1 | OUTPATIENT
Start: 2025-09-02